# Patient Record
Sex: MALE | Race: WHITE | HISPANIC OR LATINO
[De-identification: names, ages, dates, MRNs, and addresses within clinical notes are randomized per-mention and may not be internally consistent; named-entity substitution may affect disease eponyms.]

---

## 2018-01-26 PROBLEM — Z00.00 ENCOUNTER FOR PREVENTIVE HEALTH EXAMINATION: Status: ACTIVE | Noted: 2018-01-26

## 2020-07-06 ENCOUNTER — TRANSCRIPTION ENCOUNTER (OUTPATIENT)
Age: 40
End: 2020-07-06

## 2020-07-07 ENCOUNTER — TRANSCRIPTION ENCOUNTER (OUTPATIENT)
Age: 40
End: 2020-07-07

## 2020-07-07 ENCOUNTER — RESULT REVIEW (OUTPATIENT)
Age: 40
End: 2020-07-07

## 2020-07-07 ENCOUNTER — INPATIENT (INPATIENT)
Facility: HOSPITAL | Age: 40
LOS: 0 days | Discharge: HOME | DRG: 355 | End: 2020-07-08
Attending: SURGERY | Admitting: SURGERY
Payer: MEDICAID

## 2020-07-07 VITALS
HEART RATE: 83 BPM | RESPIRATION RATE: 20 BRPM | WEIGHT: 207.9 LBS | HEIGHT: 70.08 IN | DIASTOLIC BLOOD PRESSURE: 92 MMHG | TEMPERATURE: 99 F | SYSTOLIC BLOOD PRESSURE: 149 MMHG | OXYGEN SATURATION: 96 %

## 2020-07-07 DIAGNOSIS — K46.0 UNSPECIFIED ABDOMINAL HERNIA WITH OBSTRUCTION, WITHOUT GANGRENE: ICD-10-CM

## 2020-07-07 LAB
ALBUMIN SERPL ELPH-MCNC: 4.6 G/DL — SIGNIFICANT CHANGE UP (ref 3.3–5)
ALP SERPL-CCNC: 47 U/L — SIGNIFICANT CHANGE UP (ref 40–120)
ALT FLD-CCNC: 46 U/L — HIGH (ref 10–45)
ANION GAP SERPL CALC-SCNC: 12 MMOL/L — SIGNIFICANT CHANGE UP (ref 5–17)
APTT BLD: 28.6 SEC — SIGNIFICANT CHANGE UP (ref 27.5–35.5)
AST SERPL-CCNC: 32 U/L — SIGNIFICANT CHANGE UP (ref 10–40)
BASE EXCESS BLDV CALC-SCNC: 0.8 MMOL/L — SIGNIFICANT CHANGE UP (ref -2–2)
BASOPHILS # BLD AUTO: 0.01 K/UL — SIGNIFICANT CHANGE UP (ref 0–0.2)
BASOPHILS NFR BLD AUTO: 0.2 % — SIGNIFICANT CHANGE UP (ref 0–2)
BILIRUB SERPL-MCNC: 0.5 MG/DL — SIGNIFICANT CHANGE UP (ref 0.2–1.2)
BLD GP AB SCN SERPL QL: NEGATIVE — SIGNIFICANT CHANGE UP
BUN SERPL-MCNC: 14 MG/DL — SIGNIFICANT CHANGE UP (ref 7–23)
CA-I SERPL-SCNC: 1.16 MMOL/L — SIGNIFICANT CHANGE UP (ref 1.12–1.3)
CALCIUM SERPL-MCNC: 9.2 MG/DL — SIGNIFICANT CHANGE UP (ref 8.4–10.5)
CHLORIDE BLDV-SCNC: 105 MMOL/L — SIGNIFICANT CHANGE UP (ref 96–108)
CHLORIDE SERPL-SCNC: 102 MMOL/L — SIGNIFICANT CHANGE UP (ref 96–108)
CO2 BLDV-SCNC: 28 MMOL/L — SIGNIFICANT CHANGE UP (ref 22–30)
CO2 SERPL-SCNC: 23 MMOL/L — SIGNIFICANT CHANGE UP (ref 22–31)
CREAT SERPL-MCNC: 0.83 MG/DL — SIGNIFICANT CHANGE UP (ref 0.5–1.3)
EOSINOPHIL # BLD AUTO: 0.12 K/UL — SIGNIFICANT CHANGE UP (ref 0–0.5)
EOSINOPHIL NFR BLD AUTO: 2.3 % — SIGNIFICANT CHANGE UP (ref 0–6)
GAS PNL BLDV: 137 MMOL/L — SIGNIFICANT CHANGE UP (ref 135–145)
GAS PNL BLDV: SIGNIFICANT CHANGE UP
GAS PNL BLDV: SIGNIFICANT CHANGE UP
GLUCOSE BLDV-MCNC: 88 MG/DL — SIGNIFICANT CHANGE UP (ref 70–99)
GLUCOSE SERPL-MCNC: 92 MG/DL — SIGNIFICANT CHANGE UP (ref 70–99)
HCO3 BLDV-SCNC: 26 MMOL/L — SIGNIFICANT CHANGE UP (ref 21–29)
HCT VFR BLD CALC: 46.9 % — SIGNIFICANT CHANGE UP (ref 39–50)
HCT VFR BLDA CALC: 48 % — SIGNIFICANT CHANGE UP (ref 39–50)
HGB BLD CALC-MCNC: 15.7 G/DL — SIGNIFICANT CHANGE UP (ref 13–17)
HGB BLD-MCNC: 14.9 G/DL — SIGNIFICANT CHANGE UP (ref 13–17)
IMM GRANULOCYTES NFR BLD AUTO: 0.4 % — SIGNIFICANT CHANGE UP (ref 0–1.5)
INR BLD: 1.13 RATIO — SIGNIFICANT CHANGE UP (ref 0.88–1.16)
LACTATE BLDV-MCNC: 1.9 MMOL/L — SIGNIFICANT CHANGE UP (ref 0.7–2)
LIDOCAIN IGE QN: 23 U/L — SIGNIFICANT CHANGE UP (ref 7–60)
LYMPHOCYTES # BLD AUTO: 1.49 K/UL — SIGNIFICANT CHANGE UP (ref 1–3.3)
LYMPHOCYTES # BLD AUTO: 28.1 % — SIGNIFICANT CHANGE UP (ref 13–44)
MCHC RBC-ENTMCNC: 29.3 PG — SIGNIFICANT CHANGE UP (ref 27–34)
MCHC RBC-ENTMCNC: 31.8 GM/DL — LOW (ref 32–36)
MCV RBC AUTO: 92.3 FL — SIGNIFICANT CHANGE UP (ref 80–100)
MONOCYTES # BLD AUTO: 0.58 K/UL — SIGNIFICANT CHANGE UP (ref 0–0.9)
MONOCYTES NFR BLD AUTO: 10.9 % — SIGNIFICANT CHANGE UP (ref 2–14)
NEUTROPHILS # BLD AUTO: 3.08 K/UL — SIGNIFICANT CHANGE UP (ref 1.8–7.4)
NEUTROPHILS NFR BLD AUTO: 58.1 % — SIGNIFICANT CHANGE UP (ref 43–77)
NRBC # BLD: 0 /100 WBCS — SIGNIFICANT CHANGE UP (ref 0–0)
PCO2 BLDV: 48 MMHG — SIGNIFICANT CHANGE UP (ref 35–50)
PH BLDV: 7.36 — SIGNIFICANT CHANGE UP (ref 7.35–7.45)
PLATELET # BLD AUTO: 235 K/UL — SIGNIFICANT CHANGE UP (ref 150–400)
PO2 BLDV: 35 MMHG — SIGNIFICANT CHANGE UP (ref 25–45)
POTASSIUM BLDV-SCNC: 3.7 MMOL/L — SIGNIFICANT CHANGE UP (ref 3.5–5.3)
POTASSIUM SERPL-MCNC: 3.8 MMOL/L — SIGNIFICANT CHANGE UP (ref 3.5–5.3)
POTASSIUM SERPL-SCNC: 3.8 MMOL/L — SIGNIFICANT CHANGE UP (ref 3.5–5.3)
PROT SERPL-MCNC: 7.5 G/DL — SIGNIFICANT CHANGE UP (ref 6–8.3)
PROTHROM AB SERPL-ACNC: 12.8 SEC — SIGNIFICANT CHANGE UP (ref 10.6–13.6)
RBC # BLD: 5.08 M/UL — SIGNIFICANT CHANGE UP (ref 4.2–5.8)
RBC # FLD: 13.4 % — SIGNIFICANT CHANGE UP (ref 10.3–14.5)
RH IG SCN BLD-IMP: POSITIVE — SIGNIFICANT CHANGE UP
RH IG SCN BLD-IMP: POSITIVE — SIGNIFICANT CHANGE UP
SAO2 % BLDV: 59 % — LOW (ref 67–88)
SARS-COV-2 RNA SPEC QL NAA+PROBE: SIGNIFICANT CHANGE UP
SODIUM SERPL-SCNC: 137 MMOL/L — SIGNIFICANT CHANGE UP (ref 135–145)
WBC # BLD: 5.3 K/UL — SIGNIFICANT CHANGE UP (ref 3.8–10.5)
WBC # FLD AUTO: 5.3 K/UL — SIGNIFICANT CHANGE UP (ref 3.8–10.5)

## 2020-07-07 PROCEDURE — 88302 TISSUE EXAM BY PATHOLOGIST: CPT | Mod: 26

## 2020-07-07 PROCEDURE — 49561: CPT

## 2020-07-07 PROCEDURE — 99222 1ST HOSP IP/OBS MODERATE 55: CPT | Mod: 57

## 2020-07-07 PROCEDURE — 99285 EMERGENCY DEPT VISIT HI MDM: CPT

## 2020-07-07 PROCEDURE — 99053 MED SERV 10PM-8AM 24 HR FAC: CPT

## 2020-07-07 RX ORDER — HYDROMORPHONE HYDROCHLORIDE 2 MG/ML
0.5 INJECTION INTRAMUSCULAR; INTRAVENOUS; SUBCUTANEOUS EVERY 4 HOURS
Refills: 0 | Status: DISCONTINUED | OUTPATIENT
Start: 2020-07-07 | End: 2020-07-08

## 2020-07-07 RX ORDER — DEXTROSE MONOHYDRATE, SODIUM CHLORIDE, AND POTASSIUM CHLORIDE 50; .745; 4.5 G/1000ML; G/1000ML; G/1000ML
1000 INJECTION, SOLUTION INTRAVENOUS
Refills: 0 | Status: DISCONTINUED | OUTPATIENT
Start: 2020-07-07 | End: 2020-07-08

## 2020-07-07 RX ORDER — MORPHINE SULFATE 50 MG/1
4 CAPSULE, EXTENDED RELEASE ORAL ONCE
Refills: 0 | Status: DISCONTINUED | OUTPATIENT
Start: 2020-07-07 | End: 2020-07-07

## 2020-07-07 RX ORDER — HYDROMORPHONE HYDROCHLORIDE 2 MG/ML
1 INJECTION INTRAMUSCULAR; INTRAVENOUS; SUBCUTANEOUS ONCE
Refills: 0 | Status: DISCONTINUED | OUTPATIENT
Start: 2020-07-07 | End: 2020-07-07

## 2020-07-07 RX ORDER — SODIUM CHLORIDE 9 MG/ML
1000 INJECTION, SOLUTION INTRAVENOUS
Refills: 0 | Status: DISCONTINUED | OUTPATIENT
Start: 2020-07-07 | End: 2020-07-07

## 2020-07-07 RX ORDER — SODIUM CHLORIDE 9 MG/ML
1000 INJECTION INTRAMUSCULAR; INTRAVENOUS; SUBCUTANEOUS ONCE
Refills: 0 | Status: COMPLETED | OUTPATIENT
Start: 2020-07-07 | End: 2020-07-07

## 2020-07-07 RX ORDER — ENOXAPARIN SODIUM 100 MG/ML
40 INJECTION SUBCUTANEOUS DAILY
Refills: 0 | Status: DISCONTINUED | OUTPATIENT
Start: 2020-07-07 | End: 2020-07-08

## 2020-07-07 RX ORDER — ACETAMINOPHEN 500 MG
1000 TABLET ORAL ONCE
Refills: 0 | Status: COMPLETED | OUTPATIENT
Start: 2020-07-07 | End: 2020-07-07

## 2020-07-07 RX ADMIN — DEXTROSE MONOHYDRATE, SODIUM CHLORIDE, AND POTASSIUM CHLORIDE 100 MILLILITER(S): 50; .745; 4.5 INJECTION, SOLUTION INTRAVENOUS at 12:15

## 2020-07-07 RX ADMIN — MORPHINE SULFATE 4 MILLIGRAM(S): 50 CAPSULE, EXTENDED RELEASE ORAL at 08:04

## 2020-07-07 RX ADMIN — SODIUM CHLORIDE 1000 MILLILITER(S): 9 INJECTION INTRAMUSCULAR; INTRAVENOUS; SUBCUTANEOUS at 08:03

## 2020-07-07 RX ADMIN — Medication 400 MILLIGRAM(S): at 12:28

## 2020-07-07 RX ADMIN — HYDROMORPHONE HYDROCHLORIDE 1 MILLIGRAM(S): 2 INJECTION INTRAMUSCULAR; INTRAVENOUS; SUBCUTANEOUS at 09:11

## 2020-07-07 NOTE — ED PROVIDER NOTE - PHYSICAL EXAMINATION
Physical Exam:  Gen: NAD, AOx3, non-toxic appearing, able to ambulate without assistance  Lung: CTAB, no respiratory distress, no wheezes/rhonchi/rales B/L, speaking in full sentences  CV: RRR, no murmurs, rubs or gallops, distal pulses 2+ b/l  Abd: incarcerated tender non-erythematous superior periumbilical hernia, soft, ND, no guarding, no rigidity, no rebound tenderness, no CVA tenderness   Skin: Warm, well perfused, no rash, no leg swelling  Psych: normal affect, calm

## 2020-07-07 NOTE — ED PROVIDER NOTE - CLINICAL SUMMARY MEDICAL DECISION MAKING FREE TEXT BOX
Ling Cee MD 40 M w/ no PMH/no PSH nonsmoker, nondrinker  presents w/ acute abdominal pain started at 2AM, pt states he was at OSH, and was told he has an incarcerated fat hernia on 7/6/2020. Pt presents now w/ worsenign pain, no nause no vomiting, passing gas, normal BM today. On exam, has a firm area above the umbilicus, c/w incarcerated fat hernia, no overlying skin changes, will dsicuss w/ sx obtain labs.

## 2020-07-07 NOTE — ED ADULT NURSE NOTE - CHPI ED NUR SYMPTOMS NEG
no abdominal distension/no blood in stool/no burning urination/no nausea/no diarrhea/no chills/no fever/no hematuria/no dysuria

## 2020-07-07 NOTE — H&P ADULT - ATTENDING COMMENTS
Pt seen and examined. Agree with A/P. Admit to [facility] with incarcerated supraumbilical hernia. Taken to OR for repair of supraumbilical and umbilical hernias with mesh, tolerated procedure well. Will advance diet, pain control, home in AM.

## 2020-07-07 NOTE — ED ADULT NURSE NOTE - OBJECTIVE STATEMENT
41yo male p/w periumbilical pain for 3 days. Went to another hospital  on Sunday and was discharged yesterday. CT scan report at bedside identified incarcerated fat-containing hernia and he was given pain medication during his stay. Since his discharge  last night the pain has persisted and feels better when sitting or standing, worse when laying. Last meal this AM. Denies N/V/D, fever, blood in the stool, h/o surgeries, has been having normal BM's. IVL placed and bloods and type sent as ordered.

## 2020-07-07 NOTE — H&P ADULT - NSHPPHYSICALEXAM_GEN_ALL_CORE
VITALS  T(C): 37.2 (07-07-20 @ 07:22), Max: 37.2 (07-07-20 @ 07:22)  HR: 70 (07-07-20 @ 07:51) (70 - 83)  BP: 113/89 (07-07-20 @ 07:51) (113/89 - 149/92)  RR: 18 (07-07-20 @ 07:51) (18 - 20)  SpO2: 99% (07-07-20 @ 07:51) (96% - 99%)  I&O's Detail      Physical Exam:  	Gen: NAD, A&Ox3, WN/ WG/WD  	HEENT: NC/AT, PERRL, supple neck, trachea midline, mucosa moist, clear oropharynx  	Pulm: CTA B/L  	CV: RRR                GI: +hypoactive BS, softly distended,  (+)tender over umbilicus          UE: Warm, FROM, no clubbing, intact strength; no edema; no asterixis  	LE: Warm, FROM, no clubbing, intact strength; no edema  	Neuro: No focal deficits, intact gait  	Psych: Normal affect and mood  	Skin: Warm, without rashes VITALS  T(C): 37.2 (07-07-20 @ 07:22), Max: 37.2 (07-07-20 @ 07:22)  HR: 70 (07-07-20 @ 07:51) (70 - 83)  BP: 113/89 (07-07-20 @ 07:51) (113/89 - 149/92)  RR: 18 (07-07-20 @ 07:51) (18 - 20)  SpO2: 99% (07-07-20 @ 07:51) (96% - 99%)  I&O's Detail      Physical Exam:  	Gen: NAD, A&Ox3, WN/ WG/WD  	HEENT: NC/AT, PERRL, supple neck, trachea midline, mucosa moist, clear oropharynx  	Pulm: CTA B/L  	CV: RRR                GI: +hypoactive BS, softly distended,  (+)generalized tenderness.                        greatest  over umbilicus and at 1-2 o'clock area                        no rebound.  (+)voluntary guarding                MSK:  FROM x4, no deformities                Vascular: Equally warm, no clubbing, cyanosis, nor edema  	Neuro: No focal deficits, sensation & strength grossly intact  	Psych: Normal affect and mood  	Skin: Warm, moist w/o without rashes, good turgor

## 2020-07-07 NOTE — H&P ADULT - HISTORY OF PRESENT ILLNESS
41yo M w/o significant PMH presented to ED with increasing periumbilical pain.  Pt was recently admitted on Sunday (2d ago) to [facility] for similiar pain.  Pt had abd/ pelvic CT there that identified an incarcerated periumbilical hernia.  Pt's pain improved and pt was dc home with instructions to return to ER if pain reoccurs.   Pain progressively increased late yesterday until he couldn't tolerate w/ po pain medications and came to our ED.  Pt had noticed a small bulge that would go in & out over the last few years, but never with so much pain.  No wt loss.  Pt denies N/V.  (+)BM this am.  (+)urine normal.  No f/c/s.  No cp/ palp/ sob/ dyspnea.   Pt last ate solid food last pm, but had coffee this am.   Pt given 4mg Morphine while awaiting consult.  This didn't improve pain at all.  RN just gave pt Diludid. 41yo M w/o significant PMH presented to ED with increasing periumbilical pain.  Pt was recently admitted on Sunday (2d ago) to [facility] [place] for similiar pain.  Pt had abd/ pelvic CT there that identified an incarcerated  periumbilical hernia containing fat.  Pt's pain improved and pt was dc home with instructions to return to ER if pain reoccurs.   Pain progressively increased late yesterday until he couldn't tolerate w/ po pain medications and came to our ED.  Pt had noticed a small bulge that would go in & out over the last few years, but never with so much pain.  No wt loss.  Pt denies N/V.  (+)BM/flatus this am.  (+)urine normal.  No f/c/s.  No cp/ palp/ sob/ dyspnea.   Pt last ate solid food last pm, but had coffee this am.   Pt given 4mg Morphine while awaiting consult.  This didn't improve pain at all.  RN just gave pt Diludid.

## 2020-07-07 NOTE — ED PROVIDER NOTE - OBJECTIVE STATEMENT
39yo male p/w periumbilical pain for 3 days. Went to OSH on Sunday and was discharged yesterday. CT scan report at bedside identified incarcerated fat-containing hernia and he was given pain medication during his stay. Since his discharge  last night the pain has persisted and feels better when sitting or standing, worse when laying. Last meal this AM. Denies N/V/D, fever, blood in the stool, h/o surgeries, has been having normal BM's.

## 2020-07-07 NOTE — ED ADULT NURSE REASSESSMENT NOTE - NS ED NURSE REASSESS COMMENT FT1
pt states pain is better after Tylenol. surgery calling pts son to explain surgical procedure. Pt in nad. vss. pending OR after 5pm.

## 2020-07-07 NOTE — H&P ADULT - ASSESSMENT
A/P  41yo M w/o h/o umbilical hernia admitted w/ periumbilical hernia with incarcerated fat.  Pt hemodynamically stable.    -Admit to [facility]  -Booked for Umbilical Hernia Repair- on OR add-on schedule  -NPO  -IVF  -pain med prn  -serial exams  -labs in am  -bed rest  -dvt ppx   -vitals as per routine  -d/w ED resident    Sasha Barrett PA-C  [address]  d/w Dr MAGDALENA Tellez

## 2020-07-07 NOTE — H&P ADULT - NSHPLABSRESULTS_GEN_ALL_CORE
LABS:                        14.9   5.30  )-----------( 235      ( 07 Jul 2020 08:12 )             46.9     07-07    137  |  102  |  14  ----------------------------<  92  3.8   |  23  |  0.83    Ca    9.2      07 Jul 2020 08:12    TPro  7.5  /  Alb  4.6  /  TBili  0.5  /  DBili  x   /  AST  32  /  ALT  46<H>  /  AlkPhos  47  07-07    Ionized Calcium Levels:     CAPILLARY BLOOD GLUCOSE      CAPILLARY BLOOD GLUCOSE        PT/INR - ( 07 Jul 2020 08:12 )   PT: 12.8 sec;   INR: 1.13 ratio         PTT - ( 07 Jul 2020 08:12 )  PTT:28.6 sec    Hemoglobin A1C LABS:                        14.9   5.30  )-----------( 235      ( 07 Jul 2020 08:12 )             46.9     07-07    137  |  102  |  14  ----------------------------<  92  3.8   |  23  |  0.83    Ca    9.2      07 Jul 2020 08:12  Blood Gas Calcium, Ionized - Venous: 1.16 mmoL/L (07.07.20 @ 08:12)    TPro  7.5  /  Alb  4.6  /  TBili  0.5  /  DBili  x   /  AST  32  /  ALT  46<H>  /  AlkPhos  47  07-07      PT/INR - ( 07 Jul 2020 08:12 )   PT: 12.8 sec;   INR: 1.13 ratio    PTT - ( 07 Jul 2020 08:12 )  PTT:28.6 sec    Blood Gas Venous - Lactate (07.07.20 @ 08:12)    Blood Gas Venous - Lactate: 1.9 mmoL/L    Blood Gas Profile - Venous (07.07.20 @ 08:12)    pH, Venous: 7.36    pCO2, Venous: 48 mmHg    pO2, Venous: 35 mmHg    HCO3, Venous: 26 mmol/L    Base Excess, Venous: 0.8 mmol/L    Oxygen Saturation, Venous: 59 %    Total CO2, Venous: 28 mmol/L    Blood Gas Source Venous: Venous    COVID-19 PCR . (07.07.20 @ 09:26)    COVID-19 PCR: NotDetec: This test has been validated by [employer] to be accurate;  though it has not been FDA cleared/approved by the usual pathway  As with all laboratory test, results should be correlated with clinical  findings.  https://www.fda.gov/media/705463/download  https://www.fda.gov/media/491381/download

## 2020-07-07 NOTE — ED ADULT NURSE NOTE - NSIMPLEMENTINTERV_GEN_ALL_ED
Implemented All Universal Safety Interventions:  [place] to call system. Call bell, personal items and telephone within reach. Instruct patient to call for assistance. Room bathroom lighting operational. Non-slip footwear when patient is off stretcher. Physically safe environment: no spills, clutter or unnecessary equipment. Stretcher in lowest position, wheels locked, appropriate side rails in place.

## 2020-07-08 ENCOUNTER — TRANSCRIPTION ENCOUNTER (OUTPATIENT)
Age: 40
End: 2020-07-08

## 2020-07-08 VITALS
HEART RATE: 60 BPM | TEMPERATURE: 98 F | RESPIRATION RATE: 18 BRPM | SYSTOLIC BLOOD PRESSURE: 128 MMHG | OXYGEN SATURATION: 95 % | DIASTOLIC BLOOD PRESSURE: 67 MMHG

## 2020-07-08 LAB
ANION GAP SERPL CALC-SCNC: 13 MMOL/L — SIGNIFICANT CHANGE UP (ref 5–17)
BUN SERPL-MCNC: 10 MG/DL — SIGNIFICANT CHANGE UP (ref 7–23)
CALCIUM SERPL-MCNC: 8.7 MG/DL — SIGNIFICANT CHANGE UP (ref 8.4–10.5)
CHLORIDE SERPL-SCNC: 104 MMOL/L — SIGNIFICANT CHANGE UP (ref 96–108)
CO2 SERPL-SCNC: 20 MMOL/L — LOW (ref 22–31)
CREAT SERPL-MCNC: 0.8 MG/DL — SIGNIFICANT CHANGE UP (ref 0.5–1.3)
GLUCOSE SERPL-MCNC: 122 MG/DL — HIGH (ref 70–99)
HCT VFR BLD CALC: 46.2 % — SIGNIFICANT CHANGE UP (ref 39–50)
HGB BLD-MCNC: 14.6 G/DL — SIGNIFICANT CHANGE UP (ref 13–17)
MAGNESIUM SERPL-MCNC: 2.1 MG/DL — SIGNIFICANT CHANGE UP (ref 1.6–2.6)
MCHC RBC-ENTMCNC: 29.8 PG — SIGNIFICANT CHANGE UP (ref 27–34)
MCHC RBC-ENTMCNC: 31.6 GM/DL — LOW (ref 32–36)
MCV RBC AUTO: 94.3 FL — SIGNIFICANT CHANGE UP (ref 80–100)
NRBC # BLD: 0 /100 WBCS — SIGNIFICANT CHANGE UP (ref 0–0)
PHOSPHATE SERPL-MCNC: 3.3 MG/DL — SIGNIFICANT CHANGE UP (ref 2.5–4.5)
PLATELET # BLD AUTO: 222 K/UL — SIGNIFICANT CHANGE UP (ref 150–400)
POTASSIUM SERPL-MCNC: 4.2 MMOL/L — SIGNIFICANT CHANGE UP (ref 3.5–5.3)
POTASSIUM SERPL-SCNC: 4.2 MMOL/L — SIGNIFICANT CHANGE UP (ref 3.5–5.3)
RBC # BLD: 4.9 M/UL — SIGNIFICANT CHANGE UP (ref 4.2–5.8)
RBC # FLD: 13.5 % — SIGNIFICANT CHANGE UP (ref 10.3–14.5)
SODIUM SERPL-SCNC: 137 MMOL/L — SIGNIFICANT CHANGE UP (ref 135–145)
WBC # BLD: 7.47 K/UL — SIGNIFICANT CHANGE UP (ref 3.8–10.5)
WBC # FLD AUTO: 7.47 K/UL — SIGNIFICANT CHANGE UP (ref 3.8–10.5)

## 2020-07-08 PROCEDURE — 84295 ASSAY OF SERUM SODIUM: CPT

## 2020-07-08 PROCEDURE — 86900 BLOOD TYPING SEROLOGIC ABO: CPT

## 2020-07-08 PROCEDURE — 83605 ASSAY OF LACTIC ACID: CPT

## 2020-07-08 PROCEDURE — 85014 HEMATOCRIT: CPT

## 2020-07-08 PROCEDURE — 88302 TISSUE EXAM BY PATHOLOGIST: CPT

## 2020-07-08 PROCEDURE — 96375 TX/PRO/DX INJ NEW DRUG ADDON: CPT

## 2020-07-08 PROCEDURE — 82947 ASSAY GLUCOSE BLOOD QUANT: CPT

## 2020-07-08 PROCEDURE — 83735 ASSAY OF MAGNESIUM: CPT

## 2020-07-08 PROCEDURE — C1781: CPT

## 2020-07-08 PROCEDURE — 80053 COMPREHEN METABOLIC PANEL: CPT

## 2020-07-08 PROCEDURE — 82803 BLOOD GASES ANY COMBINATION: CPT

## 2020-07-08 PROCEDURE — 96374 THER/PROPH/DIAG INJ IV PUSH: CPT

## 2020-07-08 PROCEDURE — 85730 THROMBOPLASTIN TIME PARTIAL: CPT

## 2020-07-08 PROCEDURE — 85027 COMPLETE CBC AUTOMATED: CPT

## 2020-07-08 PROCEDURE — 99285 EMERGENCY DEPT VISIT HI MDM: CPT | Mod: 25

## 2020-07-08 PROCEDURE — 85610 PROTHROMBIN TIME: CPT

## 2020-07-08 PROCEDURE — 86901 BLOOD TYPING SEROLOGIC RH(D): CPT

## 2020-07-08 PROCEDURE — 82330 ASSAY OF CALCIUM: CPT

## 2020-07-08 PROCEDURE — 86850 RBC ANTIBODY SCREEN: CPT

## 2020-07-08 PROCEDURE — 83690 ASSAY OF LIPASE: CPT

## 2020-07-08 PROCEDURE — 84132 ASSAY OF SERUM POTASSIUM: CPT

## 2020-07-08 PROCEDURE — 80048 BASIC METABOLIC PNL TOTAL CA: CPT

## 2020-07-08 PROCEDURE — 87635 SARS-COV-2 COVID-19 AMP PRB: CPT

## 2020-07-08 PROCEDURE — 82435 ASSAY OF BLOOD CHLORIDE: CPT

## 2020-07-08 PROCEDURE — 84100 ASSAY OF PHOSPHORUS: CPT

## 2020-07-08 RX ORDER — ACETAMINOPHEN 500 MG
650 TABLET ORAL EVERY 6 HOURS
Refills: 0 | Status: DISCONTINUED | OUTPATIENT
Start: 2020-07-08 | End: 2020-07-08

## 2020-07-08 RX ORDER — HYDROMORPHONE HYDROCHLORIDE 2 MG/ML
0.5 INJECTION INTRAMUSCULAR; INTRAVENOUS; SUBCUTANEOUS
Refills: 0 | Status: DISCONTINUED | OUTPATIENT
Start: 2020-07-08 | End: 2020-07-08

## 2020-07-08 RX ORDER — ACETAMINOPHEN 500 MG
2 TABLET ORAL
Qty: 0 | Refills: 0 | DISCHARGE
Start: 2020-07-08

## 2020-07-08 RX ORDER — ONDANSETRON 8 MG/1
4 TABLET, FILM COATED ORAL ONCE
Refills: 0 | Status: DISCONTINUED | OUTPATIENT
Start: 2020-07-08 | End: 2020-07-08

## 2020-07-08 RX ORDER — IBUPROFEN 200 MG
600 TABLET ORAL EVERY 6 HOURS
Refills: 0 | Status: DISCONTINUED | OUTPATIENT
Start: 2020-07-08 | End: 2020-07-08

## 2020-07-08 RX ORDER — SODIUM CHLORIDE 9 MG/ML
1000 INJECTION INTRAMUSCULAR; INTRAVENOUS; SUBCUTANEOUS
Refills: 0 | Status: DISCONTINUED | OUTPATIENT
Start: 2020-07-08 | End: 2020-07-08

## 2020-07-08 RX ORDER — IBUPROFEN 200 MG
1 TABLET ORAL
Qty: 0 | Refills: 0 | DISCHARGE
Start: 2020-07-08

## 2020-07-08 RX ORDER — OXYCODONE HYDROCHLORIDE 5 MG/1
1 TABLET ORAL
Qty: 12 | Refills: 0
Start: 2020-07-08 | End: 2020-07-10

## 2020-07-08 RX ORDER — OXYCODONE HYDROCHLORIDE 5 MG/1
5 TABLET ORAL EVERY 6 HOURS
Refills: 0 | Status: DISCONTINUED | OUTPATIENT
Start: 2020-07-08 | End: 2020-07-08

## 2020-07-08 RX ADMIN — SODIUM CHLORIDE 30 MILLILITER(S): 9 INJECTION INTRAMUSCULAR; INTRAVENOUS; SUBCUTANEOUS at 00:50

## 2020-07-08 RX ADMIN — Medication 650 MILLIGRAM(S): at 06:09

## 2020-07-08 RX ADMIN — Medication 600 MILLIGRAM(S): at 13:24

## 2020-07-08 RX ADMIN — OXYCODONE HYDROCHLORIDE 5 MILLIGRAM(S): 5 TABLET ORAL at 09:13

## 2020-07-08 RX ADMIN — Medication 600 MILLIGRAM(S): at 06:09

## 2020-07-08 NOTE — DISCHARGE NOTE PROVIDER - NSDCFUADDINST_GEN_ALL_CORE_FT
WOUND CARE: You may shower. Pat Dry abdomen. Leave the band-aid in place, they will fall off on their own in approximately 5-7 days (you may remove them after 5 days if they do not fall off).  will be removed at follow up office visit.    BATHING: Please do not submerge wound underwater. You may shower and/or sponge bathe.

## 2020-07-08 NOTE — PROGRESS NOTE ADULT - SUBJECTIVE AND OBJECTIVE BOX
ACS DAILY PROGRESS NOTE:       SUBJECTIVE/ROS: Patient seen and examined this morning at the bedside. No acute events overnight. Patient is POD1 for umbilical hernia repair with mesh. Patient says his pain is well controlled when he gets his pain medications. He denies any complaints at this time.         MEDICATIONS  (STANDING):  acetaminophen   Tablet .. 650 milliGRAM(s) Oral every 6 hours  enoxaparin Injectable 40 milliGRAM(s) SubCutaneous daily  ibuprofen  Tablet. 600 milliGRAM(s) Oral every 6 hours  sodium chloride 0.9%. 1000 milliLiter(s) (30 mL/Hr) IV Continuous <Continuous>    MEDICATIONS  (PRN):  HYDROmorphone  Injectable 0.5 milliGRAM(s) IV Push every 10 minutes PRN Moderate Pain (4 - 6)  HYDROmorphone  Injectable 0.5 milliGRAM(s) IV Push every 4 hours PRN Moderate Pain (4 - 6)  ondansetron Injectable 4 milliGRAM(s) IV Push once PRN Nausea and/or Vomiting  oxyCODONE    IR 5 milliGRAM(s) Oral every 6 hours PRN Severe Pain (7 - 10)      OBJECTIVE:    Vital Signs Last 24 Hrs  T(C): 36.7 (08 Jul 2020 05:50), Max: 37.2 (07 Jul 2020 13:33)  T(F): 98 (08 Jul 2020 05:50), Max: 98.9 (07 Jul 2020 13:33)  HR: 63 (08 Jul 2020 05:50) (48 - 70)  BP: 104/67 (08 Jul 2020 05:50) (96/64 - 134/90)  BP(mean): 79 (08 Jul 2020 01:15) (71 - 79)  RR: 18 (08 Jul 2020 05:50) (14 - 19)  SpO2: 92% (08 Jul 2020 05:50) (92% - 100%)    I&O's Detail    07 Jul 2020 07:01  -  08 Jul 2020 07:00  --------------------------------------------------------  IN:    sodium chloride 0.9%.: 135 mL  Total IN: 135 mL    OUT:  Total OUT: 0 mL    Total NET: 135 mL          Daily Height in cm: 178 (07 Jul 2020 22:47)    Daily     LABS:                        14.9   5.30  )-----------( 235      ( 07 Jul 2020 08:12 )             46.9     07-07    137  |  102  |  14  ----------------------------<  92  3.8   |  23  |  0.83    Ca    9.2      07 Jul 2020 08:12    TPro  7.5  /  Alb  4.6  /  TBili  0.5  /  DBili  x   /  AST  32  /  ALT  46<H>  /  AlkPhos  47  07-07    PT/INR - ( 07 Jul 2020 08:12 )   PT: 12.8 sec;   INR: 1.13 ratio         PTT - ( 07 Jul 2020 08:12 )  PTT:28.6 sec              PHYSICAL EXAM:  Constitutional: well developed, well nourished, NAD  Eyes: anicteric  ENMT: normal facies, symmetric  Neck: supple, trach midline  Respiratory: non-labored respirations  Cardiovascular: RRR  Gastrointestinal: abdomen soft, periumbilical tenderness, nondistended. No obvious masses. No peritonitis. Dressing clean, dry, intact  Extremities: FROM, warm  Neurological: intact, non-focal  Skin: no gross lesions  Musculoskeletal: equal strength bilateral upper and lower extremities  Psychiatric: oriented x 3; appropriate      Wojciech Toscano MD

## 2020-07-08 NOTE — DISCHARGE NOTE PROVIDER - NSDCCPTREATMENT_GEN_ALL_CORE_FT
PRINCIPAL PROCEDURE  Procedure: Repair, hernia, ventral, with mesh  Findings and Treatment: Incarcerated ventral hernia containing omentum repaired with mesh, zero prolene sutures

## 2020-07-08 NOTE — PROGRESS NOTE ADULT - ASSESSMENT
Assessment:  39yo M w/o significant PMH presented to ED with increasing periumbilical pain.  Pt was recently admitted on Sunday (2d ago) to [facility] for similar pain.  Pt had abd/ pelvic CT there that identified an incarcerated  periumbilical hernia containing fat.  Pt's pain improved and pt was dc home with instructions to return to ER if pain reoccurs. Pain progressively increased late yesterday.  Pt had noticed a small bulge that would go in & out over the last few years, but never with so much pain. Patient is now POD0, s/p hernia repair, with mesh. Recovering well post operatively.    Plan:   Diet: Regular   DVT: Lovenox, IS  Pain: Tylenol 650mg q6, Motrin [id number], Dilaudid prn Assessment:  41yo M w/o significant PMH presented to ED with increasing periumbilical pain.  Pt was recently admitted on Sunday (2d ago) to [facility] for similar pain.  Pt had abd/ pelvic CT there that identified an incarcerated  periumbilical hernia containing fat.  Pt's pain improved and pt was dc home with instructions to return to ER if pain reoccurs. Pain progressively increased late yesterday.  Pt had noticed a small bulge that would go in & out over the last few years, but never with so much pain. Patient is now POD0, s/p hernia repair, with mesh. Recovering well post operatively.    Plan:   Diet: Regular   DVT: Lovenox, IS  Pain: Tylenol 650mg q6, Motrin [id number], Dilaudid prn     Trauma surgery  [id number] Assessment:  39yo M w/o significant PMH presented to ED with increasing periumbilical pain.  Pt was recently admitted on Sunday (2d ago) to [facility] for similar pain.  Pt had abd/ pelvic CT there that identified an incarcerated  periumbilical hernia containing fat.  Pt's pain improved and pt was dc home with instructions to return to ER if pain reoccurs. Pain progressively increased late yesterday and presented to our ED.  Pt had noticed a small bulge that would go in & out over the last few years, but never with so much pain. Patient is now POD0, s/p hernia repair, with mesh. Recovering well post operatively.    Plan:   - F/U AM labs  -Diet: Regular   -DVT: Lovenox, IS  -Pain: Tylenol 650mg q6, Motrin [id number], Dilaudid prn     Trauma surgery  [id number]

## 2020-07-08 NOTE — DISCHARGE NOTE PROVIDER - NSDCCPCAREPLAN_GEN_ALL_CORE_FT
PRINCIPAL DISCHARGE DIAGNOSIS  Diagnosis: Incarcerated hernia  Assessment and Plan of Treatment:   ACTIVITY: No heavy lifting anything more than 10-15lbs or straining. Otherwise, you may return to your usual level of physical activity. If you are taking narcotic pain medication (such as Percocet), do NOT drive a car, operate machinery or make important decisions.  NOTIFY YOUR SURGEON IF: You have any bleeding that does not stop,  any fever (over 100.4 F) or chills, persistent nausea/vomiting with inability to tolerate food or liquids, persistent diarrhea, or if your pain is not controlled on your discharge pain medications.  FOLLOW-UP:  1. Please call to make a follow-up appointment within one week of discharge with    2. Please follow up with your primary care physician in 1-2 weeks regarding your hospitalization.

## 2020-07-08 NOTE — PROGRESS NOTE ADULT - SUBJECTIVE AND OBJECTIVE BOX
Surgery Post-Op Note    Pre-Op Dx: Incarcerated Hernia   Procedure: Repair, hernia, ventral, with mesh    Surgeon:      Subjective:   Pt seen and examined at the bedside. Reports mild incisional pain postop. Otherwise denies n/v/sob/cp.    Vital Signs Last 24 Hrs  T(C): 36.4 (08 Jul 2020 01:00), Max: 37.2 (07 Jul 2020 07:22)  T(F): 97.5 (08 Jul 2020 01:00), Max: 98.9 (07 Jul 2020 07:22)  HR: 58 (08 Jul 2020 01:15) (48 - 83)  BP: 105/64 (08 Jul 2020 01:15) (97/58 - 149/92)  BP(mean): 79 (08 Jul 2020 01:15) (71 - 79)  RR: 16 (08 Jul 2020 01:15) (14 - 20)  SpO2: 99% (08 Jul 2020 01:15) (95% - 100%)    Physical Exam:  General: NAD, resting comfortably in bed  Neuro: A/O x 3, no focal deficits  Pulmonary: Nonlabored breathing, no respiratory distress  Abdominal: soft, appropriate tender to palpation, lap incision c/d/i with 4x4 gauze over- no strikethrough bleeding  Extremities: [facility]        LABS:             14.9   5.30  )-----------( 235      ( 07 Jul 2020 08:12 )             46.9     07-07  137  |  102  |  14  ----------------------------<  92  3.8   |  23  |  0.83    Ca    9.2      07 Jul 2020 08:12  TPro  7.5  /  Alb  4.6  /  TBili  0.5  /  DBili  x   /  AST  32  /  ALT  46<H>  /  AlkPhos  47  07-07  PT/INR - ( 07 Jul 2020 08:12 )   PT: 12.8 sec;   INR: 1.13 ratio        PTT - ( 07 Jul 2020 08:12 )  PTT:28.6 sec  CAPILLARY BLOOD GLUCOSE    LIVER FUNCTIONS - ( 07 Jul 2020 08:12 )  Alb: 4.6 g/dL / Pro: 7.5 g/dL / ALK PHOS: 47 U/L / ALT: 46 U/L / AST: 32 U/L / GGT: x         ABO Interpretation: O (07-07 @ 08:41) ACS Progress Note    Pre-Op Dx: Incarcerated Hernia   Procedure: Repair, hernia, ventral, with mesh with     Subjective:   Pt seen and examined at the bedside. Reports mild incisional pain postop. Otherwise denies n/v/sob/cp.    Vital Signs Last 24 Hrs  T(C): 36.4 (08 Jul 2020 01:00), Max: 37.2 (07 Jul 2020 07:22)  T(F): 97.5 (08 Jul 2020 01:00), Max: 98.9 (07 Jul 2020 07:22)  HR: 58 (08 Jul 2020 01:15) (48 - 83)  BP: 105/64 (08 Jul 2020 01:15) (97/58 - 149/92)  BP(mean): 79 (08 Jul 2020 01:15) (71 - 79)  RR: 16 (08 Jul 2020 01:15) (14 - 20)  SpO2: 99% (08 Jul 2020 01:15) (95% - 100%)    Physical Exam:  General: NAD, resting comfortably in bed  Neuro: A/O x 3, no focal deficits  Pulmonary: Nonlabored breathing, no respiratory distress  Abdominal: soft, appropriate tender to palpation, lap incision c/d/i with 4x4 gauze over- no strikethrough bleeding  Extremities: [facility]        LABS:             14.9   5.30  )-----------( 235      ( 07 Jul 2020 08:12 )             46.9     07-07  137  |  102  |  14  ----------------------------<  92  3.8   |  23  |  0.83    Ca    9.2      07 Jul 2020 08:12  TPro  7.5  /  Alb  4.6  /  TBili  0.5  /  DBili  x   /  AST  32  /  ALT  46<H>  /  AlkPhos  47  07-07  PT/INR - ( 07 Jul 2020 08:12 )   PT: 12.8 sec;   INR: 1.13 ratio        PTT - ( 07 Jul 2020 08:12 )  PTT:28.6 sec  CAPILLARY BLOOD GLUCOSE    LIVER FUNCTIONS - ( 07 Jul 2020 08:12 )  Alb: 4.6 g/dL / Pro: 7.5 g/dL / ALK PHOS: 47 U/L / ALT: 46 U/L / AST: 32 U/L / GGT: x         ABO Interpretation: O (07-07 @ 08:41)

## 2020-07-08 NOTE — DISCHARGE NOTE PROVIDER - HOSPITAL COURSE
41yo M w/o significant PMH presented to ED with increasing periumbilical pain.  Pt was recently admitted on Sunday (2d ago) to [facility] for similar pain.  Pt had abd/ pelvic CT there that identified an incarcerated  periumbilical hernia containing fat.  Pt's pain improved and pt was dc home with instructions to return to ER if pain reoccurs. Pain progressively increased late yesterday.  Pt had noticed a small bulge that would go in & out over the last few years, but never with so much pain. Patient is now POD0, s/p ventral hernia repair, with mesh. In the PACU, the patients' pain was controlled, vitals stable, and voiding appropriately.  The patient was transferred to the surgical floor in stable condition.     On day of discharge, the patient was tolerating diet, ambulating well and pain controlled. 41yo M w/o significant PMH presented to ED with increasing periumbilical pain.  Pt was recently admitted on Sunday (2d ago) to [facility] for similar pain.  Pt had abd/ pelvic CT there that identified an incarcerated  periumbilical hernia containing fat.  Pt's pain improved and pt was dc home with instructions to return to ER if pain reoccurs. Pain progressively increased late yesterday, patient returned to [facility] ED.        Patient was admitted, and taken to the OR for ventral hernia repair with mesh. The patient tolerated the procedure well without complications, was extubated, and transferred to the PACU in stable condition In the PACU, the patients' pain was controlled, vitals stable, tolerating PO, and voiding appropriately.  The patient was transferred to the surgical floor in stable condition.             On day of discharge, the patient was tolerating diet, ambulating well and pain controlled. The patient will be discharged home with outpatient follow up with Dr. Tellez within 1-2 weeks.

## 2020-07-08 NOTE — DISCHARGE NOTE PROVIDER - CARE PROVIDER_API CALL
Iam Tellez  SURGERY  [address] [place] [address]  Phone: [phone]  Fax: [phone]  Follow Up Time:

## 2020-07-08 NOTE — DISCHARGE NOTE NURSING/CASE MANAGEMENT/SOCIAL WORK - PATIENT PORTAL LINK FT
You can access the FollowMyHealth Patient Portal offered by [facility] by registering at the following website: [email or web address]. By joining [employer]’s FollowMyHealth portal, you will also be able to view your health information using other applications (apps) compatible with our system.

## 2020-07-08 NOTE — PROGRESS NOTE ADULT - ASSESSMENT
Assessment:  39yo M w/o significant PMH presented to ED with increasing periumbilical pain.  Pt was recently admitted on Sunday (2d ago) to [facility] for similar pain.  Pt had abd/ pelvic CT there that identified an incarcerated  periumbilical hernia containing fat.  Pt's pain improved and pt was dc home with instructions to return to ER if pain reoccurs. Pain progressively increased late yesterday and presented to our ED.  Pt had noticed a small bulge that would go in & out over the last few years, but never with so much pain. Patient is now POD1, s/p hernia repair, with mesh. Recovering well post operatively.    Plan:   Neuro: multi-modal pain control (Tylenol 650mg q6, Motrin [id number], Dilaudid prn)  Cards: hemodynamic monitoring  Pulm: incentive spirometer, OOB as tolerated  GI/: reg diet  Heme: lovenox dvt ppx    Dispo planning: dc to home once voiding, tolerating diet, and ambulating    Trauma surgery  [id number] Assessment:  41yo M w/o significant PMH presented to ED with increasing periumbilical pain.  Pt was recently admitted on Sunday (2d ago) to [facility] for similar pain.  Pt had abd/ pelvic CT there that identified an incarcerated  periumbilical hernia containing fat.  Pt's pain improved and pt was dc home with instructions to return to ER if pain reoccurs. Pain progressively increased late yesterday and presented to our ED.  Pt had noticed a small bulge that would go in & out over the last few years, but never with so much pain. Patient is now POD1, s/p hernia repair, with mesh. Recovering well post operatively.    Plan:   Neuro: multi-modal pain control  Cards: hemodynamic monitoring  Pulm: incentive spirometer, OOB as tolerated  GI/: reg diet  Heme: lovenox dvt ppx    Dispo planning: dc to home once voiding, tolerating diet, and ambulating    Trauma surgery  [id number]

## 2020-07-08 NOTE — DISCHARGE NOTE PROVIDER - NSDCACTIVITY_GEN_ALL_CORE
Do not drive or operate machinery/No heavy lifting/straining/Do not make important decisions Walking - Indoors allowed/Stairs allowed/Do not drive or operate machinery/No heavy lifting/straining/Walking - Outdoors allowed/Do not make important decisions/Showering allowed

## 2020-07-08 NOTE — DISCHARGE NOTE PROVIDER - NSDCMRMEDTOKEN_GEN_ALL_CORE_FT
acetaminophen 325 mg oral tablet: 2 tab(s) orally every 6 hours  ibuprofen 600 mg oral tablet: 1 tab(s) orally every 6 hours acetaminophen 325 mg oral tablet: 2 tab(s) orally every 6 hours  ibuprofen 600 mg oral tablet: 1 tab(s) orally every 6 hours  oxyCODONE 5 mg oral tablet: 1 tab(s) orally every 6 hours, As needed, Severe Pain (7 - 10) MDD:4 tabs

## 2020-07-10 LAB — SURGICAL PATHOLOGY STUDY: SIGNIFICANT CHANGE UP

## 2020-07-17 ENCOUNTER — APPOINTMENT (OUTPATIENT)
Dept: TRAUMA SURGERY | Facility: CLINIC | Age: 40
End: 2020-07-17
Payer: MEDICAID

## 2020-07-17 VITALS
TEMPERATURE: 98.6 F | HEIGHT: 70.47 IN | BODY MASS INDEX: 29.3 KG/M2 | DIASTOLIC BLOOD PRESSURE: 86 MMHG | SYSTOLIC BLOOD PRESSURE: 132 MMHG | HEART RATE: 96 BPM | WEIGHT: 207 LBS

## 2020-07-17 DIAGNOSIS — K43.6 OTHER AND UNSPECIFIED VENTRAL HERNIA WITH OBSTRUCTION, W/OUT GANGRENE: ICD-10-CM

## 2020-07-17 PROCEDURE — 99024 POSTOP FOLLOW-UP VISIT: CPT
